# Patient Record
Sex: MALE | Race: WHITE | Employment: OTHER | URBAN - METROPOLITAN AREA
[De-identification: names, ages, dates, MRNs, and addresses within clinical notes are randomized per-mention and may not be internally consistent; named-entity substitution may affect disease eponyms.]

---

## 2017-08-07 ENCOUNTER — HOSPITAL ENCOUNTER (OUTPATIENT)
Dept: CT IMAGING | Facility: HOSPITAL | Age: 77
Discharge: HOME | End: 2017-08-07
Attending: FAMILY MEDICINE
Payer: MEDICARE

## 2017-08-07 DIAGNOSIS — I69.811 MEMORY DEFICIT FOLLOWING OTHER CEREBROVASCULAR DISEASE: Primary | ICD-10-CM

## 2017-08-07 DIAGNOSIS — R41.82 MENTAL STATUS CHANGE: ICD-10-CM

## 2017-08-07 LAB — CREAT BLD-MCNC: 0.7 MG/DL (ref 0.5–1.5)

## 2017-08-07 PROCEDURE — 82565 ASSAY OF CREATININE: CPT

## 2017-08-07 PROCEDURE — 70470 CT HEAD/BRAIN W/O & W/DYE: CPT | Performed by: FAMILY MEDICINE

## 2017-10-26 ENCOUNTER — TELEPHONE (OUTPATIENT)
Dept: NEUROLOGY | Facility: CLINIC | Age: 77
End: 2017-10-26

## 2017-10-27 ENCOUNTER — TELEPHONE (OUTPATIENT)
Dept: NEUROLOGY | Facility: CLINIC | Age: 77
End: 2017-10-27

## 2017-10-30 ENCOUNTER — MED REC SCAN ONLY (OUTPATIENT)
Dept: NEUROLOGY | Facility: CLINIC | Age: 77
End: 2017-10-30

## 2017-10-30 ENCOUNTER — APPOINTMENT (OUTPATIENT)
Dept: LAB | Facility: HOSPITAL | Age: 77
End: 2017-10-30
Attending: INTERNAL MEDICINE
Payer: MEDICARE

## 2017-10-30 DIAGNOSIS — F02.81 OTHER FRONTOTEMPORAL DEMENTIA WITH BEHAVIORAL DISTURBANCE (HCC): ICD-10-CM

## 2017-10-30 DIAGNOSIS — G31.09 OTHER FRONTOTEMPORAL DEMENTIA WITH BEHAVIORAL DISTURBANCE (HCC): ICD-10-CM

## 2017-10-30 PROBLEM — F03.90 DEMENTIA (HCC): Status: ACTIVE | Noted: 2017-10-30

## 2017-10-30 PROCEDURE — 84443 ASSAY THYROID STIM HORMONE: CPT

## 2017-10-30 PROCEDURE — 36415 COLL VENOUS BLD VENIPUNCTURE: CPT

## 2017-10-30 NOTE — PROGRESS NOTES
Sagar Jackson : 3/3/1940     HPI:   Patient presents with:  Dementia: Patient presents today with family to discuss recent testing for memory loss. Patient had CT of brain and neuropsych testing done (report faxed to office).  Patient had MRI of brain tomás.      Current Outpatient Prescriptions:  ALPRAZolam 0.25 MG Oral Tab 1 tablet 2 (two) times daily as needed. Disp:  Rfl: 1   Donepezil HCl 10 MG Oral Tab 1 tablet daily. Disp:  Rfl: 3   ramipril 2.5 MG Oral Cap 1 capsule daily.  Disp:  Rfl: 1   aspiri complaints upper or lower extremities  PSYCHE:no depression or anxiety  NEURO: As in HPI    EXAM:     Vitals:  /62 (BP Location: Right arm, Patient Position: Sitting, Cuff Size: adult)   Pulse 76   Resp 16   Ht 70\"   Wt 186 lb   BMI 26.69 kg/m²    G history of a rapidly progressive dementia, with marked impairment of immediate and recent recall. Neuropsychological evaluation raised the possibility of a frontotemporal dementia.   I reviewed the CT and MRI images, and although he does have white matter

## 2018-01-18 ENCOUNTER — TELEPHONE (OUTPATIENT)
Dept: NEUROLOGY | Facility: CLINIC | Age: 78
End: 2018-01-18

## 2018-01-18 NOTE — TELEPHONE ENCOUNTER
Daughter dropped off FMLA intermittent leave for herself to care for patient prn. Hippa verified. Form placed on Dr Fernie vences for review and signature.

## 2018-02-15 ENCOUNTER — HOSPITAL ENCOUNTER (OUTPATIENT)
Dept: GENERAL RADIOLOGY | Facility: HOSPITAL | Age: 78
Discharge: HOME | End: 2018-02-15
Attending: FAMILY MEDICINE
Payer: MEDICARE

## 2018-02-15 DIAGNOSIS — M79.89 SWELLING OF LIMB: ICD-10-CM

## 2018-02-15 PROCEDURE — 73130 X-RAY EXAM OF HAND: CPT | Performed by: FAMILY MEDICINE

## (undated) NOTE — LETTER
[id number] [facility]  [address], [address] [facility]  [address] [facility] [phone]        Dear West Gamez MD,      I had the pleasure of seeing your patient, Aman Chiu on 10/30/2017.      Below please fi He does have a history of alcohol use and gambling in the past, but now is no longer using the alcohol. He denies any headache. He has no trouble with his vision, speech, or swallowing. No focal weakness or numbness in his extremities.   No problems wit SKIN: denies any unusual skin lesions or rashes  EYES: no visual complaints or deficits  HEENT: denies nasal congestion, sinus pain or sore throat; hearing loss negative  RESPIRATORY: denies shortness of breath, wheezing or cough   CARDIOVASCULAR: denies c Sensory: Intact to Vibration, temperature, fine touch, proprioception and pin prick in the UE and LE. 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   No Babinski, no hoffmans, no clonus  Coordination: Finger to nose, h